# Patient Record
Sex: FEMALE | Race: WHITE
[De-identification: names, ages, dates, MRNs, and addresses within clinical notes are randomized per-mention and may not be internally consistent; named-entity substitution may affect disease eponyms.]

---

## 2019-11-26 ENCOUNTER — HOSPITAL ENCOUNTER (OUTPATIENT)
Dept: HOSPITAL 39 - MRI | Age: 64
End: 2019-11-26
Attending: NURSE PRACTITIONER
Payer: MEDICARE

## 2019-11-26 DIAGNOSIS — M51.87: Primary | ICD-10-CM

## 2019-11-26 DIAGNOSIS — M47.895: ICD-10-CM

## 2019-11-26 DIAGNOSIS — M47.896: ICD-10-CM

## 2019-11-26 DIAGNOSIS — M71.38: ICD-10-CM

## 2019-11-26 NOTE — MRI
EXAM DESCRIPTION: 

Lumbar Spine w/o Contrast : Magnetic Resonance Imaging.



CLINICAL HISTORY: 

Low back pain



COMPARISON: 

Lumbar spine radiographs 12 June 2019.



TECHNIQUE: 

Multiplanar, multiple standard sequences, non contrast MRI,

lumbar spine.



FINDINGS: 

L5-S1: The disc is well visualized on axial T2 series 501, image

3. Desiccated signal in the disc with tiny posterior midline

bulge. Hyperintense T2-weighted annular fissure in the bulging

segment. Mild canal narrowing. Mild bilateral foraminal

narrowing. Degenerative hypertrophy of the facet joints and

flavum ligaments more on the right.



L4-L5: Disc desiccation posterior disc space narrowing.

Hyperintense T2 weighted annular fissure posterior midline disc.

Posterior endplates reactive to the right of midline.

Degenerative hypertrophy of the facets and ligaments. Impressing

the transverse sac with AP canal diameter normal. Moderate right

foraminal narrowing and mild left foraminal narrowing.



L3-L4: Disc desiccation with disc space maintained. No

significant bulging. Degenerative hypertrophic facets and

ligaments with mild canal narrowing. Mild narrowing right foramen

and mild to moderate narrowing left foramen. Circumscribed

hyperintense T1 and T2 weighted hemangioma in the L3 vertebral

body.



L2-L3: Minimal disc desiccation with disc space maintained and no

bulging. Hypertrophic degenerated facet and ligament. Mild canal

narrowing. Bilateral foramina are patent.



L1-L2: Normal signal in the disc and disc space maintained.

Minimal degenerative hypertrophic facets and ligaments abutting

the posterior thecal sac. Canal is patent. Mild narrowing left

foramen. Perineural cyst in the right foramen abutting the

exiting nerve root. Conus terminates just above the disc space.



T12-L1 with disc desiccation anterior narrowing and anterior

endplate ridging and disc bulging. No posterior bulge. Canal and

foramina are patent. Posterior elements unremarkable.



Paravertebral soft tissues: Prominent bilateral renal pelves.

Minimal atrophy paraspinal muscles at the lumbosacral region.

Alignment L2-L4 dextroscoliosis.. Distal cord normal signal and

caliber.  Otherwise normal marrow signal in the remaining

vertebral bodies and the posterior elements. Vertebral bodies are

not compressed at any level.



IMPRESSION: 

1. Multiple levels of degenerated hypertrophic facet joints and

degenerative hypertrophic posterior flavum ligaments contributing

to canal narrowing. Most discs are desiccated. Only minimal

spondylosis anteriorly at T12-L1 and posterior to the right at

L4-L5. No compression type vertebral body fractures.

2. Posterior annular fissures midline L5-S1 and midline L4-L5. No

canal or foraminal stenosis.

3. Perineural cyst in the right L1-L2 foramen is abutting the

exiting right L1 nerve root.



Electronically signed by:  Sohan Hardy MD  11/26/2019 8:44 PM

[facility]ST Workstation: [id number]

## 2020-01-09 ENCOUNTER — HOSPITAL ENCOUNTER (OUTPATIENT)
Dept: HOSPITAL 39 - MRI | Age: 65
End: 2020-01-09
Attending: NEUROLOGICAL SURGERY
Payer: MEDICARE

## 2020-01-09 DIAGNOSIS — M50.922: ICD-10-CM

## 2020-01-09 DIAGNOSIS — M48.02: ICD-10-CM

## 2020-01-09 DIAGNOSIS — M50.322: Primary | ICD-10-CM

## 2020-01-09 NOTE — MRI
EXAM DESCRIPTION: 



Cervical Spine



CLINICAL HISTORY: 



64 years, Female, CERVICALGIA



COMPARISON: 



None



TECHNIQUE: 



Multiplanar multi sequence images of the cervical spine were

obtained without gadolinium contrast.



FINDINGS: 



Vertebral body height and alignment are well maintained.There is

no bone marrow edema. Alignment of the craniocervical junction is

anatomic, and visualized portions of the brainstem and spinal

cord are unremarkable. Possible 9 mm left thyroid nodule only

partially visualized. The paraspinal soft tissues are

unremarkable. Disc desiccation at multiple levels in the cervical

spine.

C2-3:   No disc bulging or facet joint degeneration.

C3-4:   No disc bulging or facet joint degeneration.

C4-5:   No disc bulging or facet joint degeneration. 

C5-6:    Right-sided facet joint degeneration, right foraminal

disc bulging and right-sided uncovertebral joint hypertrophy

resulting in mild-to-moderate right-sided neuroforaminal

stenosis.  

C6-7:   No disc bulging or facet joint degeneration.

C7-T1:   No disc bulging or facet joint degeneration.



IMPRESSION:

 

Right-sided facet joint degeneration with right foraminal disc

bulging and right-sided uncovertebral joint hypertrophy at C5-6

resulting in mild to moderate right-sided neural foraminal

stenosis.



Electronically signed by:  Alejandro Amanda MD  1/9/2020 3:38 PM [facility]

Workstation: [id number]

## 2020-01-10 ENCOUNTER — HOSPITAL ENCOUNTER (OUTPATIENT)
Dept: HOSPITAL 39 - MRI | Age: 65
End: 2020-01-10
Attending: NEUROLOGICAL SURGERY
Payer: MEDICARE

## 2020-01-10 DIAGNOSIS — M54.2: ICD-10-CM

## 2020-01-10 DIAGNOSIS — E23.7: Primary | ICD-10-CM

## 2020-01-10 DIAGNOSIS — J01.20: ICD-10-CM

## 2020-01-10 DIAGNOSIS — G31.1: ICD-10-CM

## 2020-01-10 DIAGNOSIS — M48.02: ICD-10-CM

## 2020-01-10 NOTE — MRI
EXAM DESCRIPTION: Brain w/oContrast



CLINICAL HISTORY: 64 years Female, PITUITARY GLAND



COMPARISON: None.



TECHNIQUE:  Multisequence, multiplanar images of the brain

obtained without intravenous contrast.



FINDINGS:

Brain Parenchyma, Ventricles, Meninges: No restricted diffusion.

No acute intracranial hemorrhage. Mild generalized cerebral

atrophy. Scattered T2 and FLAIR white matter hyperintensities in

the superior hemispheres probably relating to chronic seen small

vessel disease. No abnormal extra-axial fluid collection.



Vascular Structures: Normal flow voids for the major intracranial

arteries and dural venous sinuses.



Calvarium, paranasal sinuses, mastoids, and orbits: No abnormal

calvarial or skull base marrow signal changes.  Mild mucosal

thickening of the right sphenoid sinus. Remaining paranasal

sinuses and mastoids are clear.  Grossly unremarkable orbits.



IMPRESSION:

1. No acute intracranial abnormality.

2. Mild senescent changes.

3. Mild right ethmoid sinus disease.

4. Not described above is a normal size and morphology of the

pituitary gland on this noncontrast study. Dedicated MRI with

dynamic contrast sequences of the pituitary gland is recommended

if there is concern for pituitary lesion.



Electronically signed by:  Sandro Whelan MD  1/10/2020 3:30 PM [facility]

Workstation: [id number]